# Patient Record
Sex: FEMALE | Race: WHITE | NOT HISPANIC OR LATINO
[De-identification: names, ages, dates, MRNs, and addresses within clinical notes are randomized per-mention and may not be internally consistent; named-entity substitution may affect disease eponyms.]

---

## 2017-01-03 ENCOUNTER — APPOINTMENT (OUTPATIENT)
Dept: SURGERY | Facility: CLINIC | Age: 52
End: 2017-01-03

## 2017-01-03 VITALS
BODY MASS INDEX: 21.26 KG/M2 | WEIGHT: 120 LBS | HEART RATE: 70 BPM | RESPIRATION RATE: 15 BRPM | DIASTOLIC BLOOD PRESSURE: 75 MMHG | SYSTOLIC BLOOD PRESSURE: 115 MMHG | HEIGHT: 63 IN

## 2017-02-01 ENCOUNTER — OUTPATIENT (OUTPATIENT)
Dept: OUTPATIENT SERVICES | Facility: HOSPITAL | Age: 52
LOS: 1 days | End: 2017-02-01
Payer: MEDICAID

## 2017-02-01 DIAGNOSIS — Z01.818 ENCOUNTER FOR OTHER PREPROCEDURAL EXAMINATION: ICD-10-CM

## 2017-02-01 DIAGNOSIS — K82.4 CHOLESTEROLOSIS OF GALLBLADDER: ICD-10-CM

## 2017-02-01 DIAGNOSIS — K80.50 CALCULUS OF BILE DUCT WITHOUT CHOLANGITIS OR CHOLECYSTITIS WITHOUT OBSTRUCTION: ICD-10-CM

## 2017-02-01 LAB
ALBUMIN SERPL ELPH-MCNC: 4.3 G/DL — SIGNIFICANT CHANGE UP (ref 3.3–5)
ALP SERPL-CCNC: 50 U/L — SIGNIFICANT CHANGE UP (ref 40–120)
ALT FLD-CCNC: 10 U/L — SIGNIFICANT CHANGE UP (ref 10–45)
AMYLASE P1 CFR SERPL: 54 U/L — SIGNIFICANT CHANGE UP (ref 25–125)
ANION GAP SERPL CALC-SCNC: 14 MMOL/L — SIGNIFICANT CHANGE UP (ref 5–17)
AST SERPL-CCNC: 12 U/L — SIGNIFICANT CHANGE UP (ref 10–40)
BILIRUB DIRECT SERPL-MCNC: 0.1 MG/DL — SIGNIFICANT CHANGE UP (ref 0–0.2)
BILIRUB INDIRECT FLD-MCNC: 0.4 MG/DL — SIGNIFICANT CHANGE UP (ref 0.2–1)
BILIRUB SERPL-MCNC: 0.5 MG/DL — SIGNIFICANT CHANGE UP (ref 0.2–1.2)
BUN SERPL-MCNC: 9 MG/DL — SIGNIFICANT CHANGE UP (ref 7–23)
CALCIUM SERPL-MCNC: 9.3 MG/DL — SIGNIFICANT CHANGE UP (ref 8.4–10.5)
CHLORIDE SERPL-SCNC: 101 MMOL/L — SIGNIFICANT CHANGE UP (ref 96–108)
CO2 SERPL-SCNC: 22 MMOL/L — SIGNIFICANT CHANGE UP (ref 22–31)
CREAT SERPL-MCNC: 0.78 MG/DL — SIGNIFICANT CHANGE UP (ref 0.5–1.3)
GLUCOSE SERPL-MCNC: 94 MG/DL — SIGNIFICANT CHANGE UP (ref 70–99)
HCT VFR BLD CALC: 36.4 % — SIGNIFICANT CHANGE UP (ref 34.5–45)
HGB BLD-MCNC: 12.2 G/DL — SIGNIFICANT CHANGE UP (ref 11.5–15.5)
MCHC RBC-ENTMCNC: 33.1 PG — SIGNIFICANT CHANGE UP (ref 27–34)
MCHC RBC-ENTMCNC: 33.5 GM/DL — SIGNIFICANT CHANGE UP (ref 32–36)
MCV RBC AUTO: 98.6 FL — SIGNIFICANT CHANGE UP (ref 80–100)
PLATELET # BLD AUTO: 202 K/UL — SIGNIFICANT CHANGE UP (ref 150–400)
POTASSIUM SERPL-MCNC: 3.7 MMOL/L — SIGNIFICANT CHANGE UP (ref 3.5–5.3)
POTASSIUM SERPL-SCNC: 3.7 MMOL/L — SIGNIFICANT CHANGE UP (ref 3.5–5.3)
PROT SERPL-MCNC: 7.3 G/DL — SIGNIFICANT CHANGE UP (ref 6–8.3)
RBC # BLD: 3.69 M/UL — LOW (ref 3.8–5.2)
RBC # FLD: 13.5 % — SIGNIFICANT CHANGE UP (ref 10.3–14.5)
SODIUM SERPL-SCNC: 137 MMOL/L — SIGNIFICANT CHANGE UP (ref 135–145)
WBC # BLD: 6.68 K/UL — SIGNIFICANT CHANGE UP (ref 3.8–10.5)
WBC # FLD AUTO: 6.68 K/UL — SIGNIFICANT CHANGE UP (ref 3.8–10.5)

## 2017-02-01 PROCEDURE — 80048 BASIC METABOLIC PNL TOTAL CA: CPT

## 2017-02-01 PROCEDURE — G0463: CPT

## 2017-02-01 PROCEDURE — 85027 COMPLETE CBC AUTOMATED: CPT

## 2017-02-01 PROCEDURE — 82150 ASSAY OF AMYLASE: CPT

## 2017-02-01 PROCEDURE — 36415 COLL VENOUS BLD VENIPUNCTURE: CPT

## 2017-02-01 PROCEDURE — 80076 HEPATIC FUNCTION PANEL: CPT

## 2017-02-08 ENCOUNTER — RESULT REVIEW (OUTPATIENT)
Age: 52
End: 2017-02-08

## 2017-02-09 ENCOUNTER — OUTPATIENT (OUTPATIENT)
Dept: OUTPATIENT SERVICES | Facility: HOSPITAL | Age: 52
LOS: 1 days | End: 2017-02-09
Payer: MEDICAID

## 2017-02-09 DIAGNOSIS — K80.50 CALCULUS OF BILE DUCT WITHOUT CHOLANGITIS OR CHOLECYSTITIS WITHOUT OBSTRUCTION: ICD-10-CM

## 2017-02-09 DIAGNOSIS — K82.4 CHOLESTEROLOSIS OF GALLBLADDER: ICD-10-CM

## 2017-02-09 PROCEDURE — 88304 TISSUE EXAM BY PATHOLOGIST: CPT | Mod: 26

## 2017-02-09 PROCEDURE — 88304 TISSUE EXAM BY PATHOLOGIST: CPT

## 2017-02-09 PROCEDURE — 47562 LAPAROSCOPIC CHOLECYSTECTOMY: CPT

## 2017-02-09 RX ORDER — OXYCODONE HYDROCHLORIDE 5 MG/1
1 TABLET ORAL
Qty: 20 | Refills: 0 | OUTPATIENT
Start: 2017-02-09

## 2017-02-09 NOTE — ASU DISCHARGE PLAN (ADULT/PEDIATRIC). - NOTIFY
Fever greater than 101/Bleeding that does not stop/Swelling that continues/Persistent Nausea and Vomiting

## 2017-02-09 NOTE — ASU DISCHARGE PLAN (ADULT/PEDIATRIC). - MEDICATION SUMMARY - MEDICATIONS TO TAKE
I will START or STAY ON the medications listed below when I get home from the hospital:    acetaminophen-oxyCODONE 325 mg-5 mg oral tablet  -- 1 tab(s) by mouth every 6 hours, As Needed -for moderate pain MDD:4 tabs  -- Caution federal law prohibits the transfer of this drug to any person other  than the person for whom it was prescribed.  May cause drowsiness.  Alcohol may intensify this effect.  Use care when operating dangerous machinery.  This prescription cannot be refilled.  This product contains acetaminophen.  Do not use  with any other product containing acetaminophen to prevent possible liver damage.  Using more of this medication than prescribed may cause serious breathing problems.    -- Indication: For Pain

## 2017-02-10 ENCOUNTER — TRANSCRIPTION ENCOUNTER (OUTPATIENT)
Age: 52
End: 2017-02-10

## 2017-02-13 ENCOUNTER — APPOINTMENT (OUTPATIENT)
Dept: ENDOCRINOLOGY | Facility: CLINIC | Age: 52
End: 2017-02-13

## 2017-02-13 VITALS
HEART RATE: 85 BPM | DIASTOLIC BLOOD PRESSURE: 70 MMHG | WEIGHT: 117 LBS | SYSTOLIC BLOOD PRESSURE: 108 MMHG | HEIGHT: 63 IN | OXYGEN SATURATION: 95 % | BODY MASS INDEX: 20.73 KG/M2

## 2017-02-13 LAB
ALBUMIN SERPL ELPH-MCNC: 4.1 G/DL
ALP BLD-CCNC: 49 U/L
ALT SERPL-CCNC: 17 U/L
ANION GAP SERPL CALC-SCNC: 16 MMOL/L
AST SERPL-CCNC: 11 U/L
BILIRUB SERPL-MCNC: 0.4 MG/DL
BUN SERPL-MCNC: 7 MG/DL
CALCIUM SERPL-MCNC: 9.2 MG/DL
CHLORIDE SERPL-SCNC: 101 MMOL/L
CO2 SERPL-SCNC: 23 MMOL/L
CREAT SERPL-MCNC: 0.6 MG/DL
GLUCOSE SERPL-MCNC: 100 MG/DL
POTASSIUM SERPL-SCNC: 4.6 MMOL/L
PROT SERPL-MCNC: 6.9 G/DL
SODIUM SERPL-SCNC: 140 MMOL/L
SURGICAL PATHOLOGY STUDY: SIGNIFICANT CHANGE UP

## 2017-02-17 ENCOUNTER — RESULT REVIEW (OUTPATIENT)
Age: 52
End: 2017-02-17

## 2017-02-17 LAB
T4 FREE SERPL-MCNC: 1.7 NG/DL
TSH SERPL-ACNC: 0.28 UIU/ML

## 2017-02-28 ENCOUNTER — APPOINTMENT (OUTPATIENT)
Dept: SURGERY | Facility: CLINIC | Age: 52
End: 2017-02-28

## 2017-02-28 VITALS
SYSTOLIC BLOOD PRESSURE: 116 MMHG | TEMPERATURE: 98.2 F | WEIGHT: 113 LBS | HEIGHT: 63 IN | HEART RATE: 81 BPM | OXYGEN SATURATION: 98 % | RESPIRATION RATE: 13 BRPM | BODY MASS INDEX: 20.02 KG/M2 | DIASTOLIC BLOOD PRESSURE: 74 MMHG

## 2017-06-16 ENCOUNTER — RX RENEWAL (OUTPATIENT)
Age: 52
End: 2017-06-16

## 2017-08-09 ENCOUNTER — APPOINTMENT (OUTPATIENT)
Dept: ENDOCRINOLOGY | Facility: CLINIC | Age: 52
End: 2017-08-09
Payer: MEDICAID

## 2017-08-09 VITALS
DIASTOLIC BLOOD PRESSURE: 80 MMHG | OXYGEN SATURATION: 98 % | HEIGHT: 63 IN | SYSTOLIC BLOOD PRESSURE: 120 MMHG | WEIGHT: 120 LBS | BODY MASS INDEX: 21.26 KG/M2 | HEART RATE: 79 BPM

## 2017-08-09 PROCEDURE — 99214 OFFICE O/P EST MOD 30 MIN: CPT

## 2017-08-09 RX ORDER — FEXOFENADINE HCL 60 MG
CAPSULE ORAL
Refills: 0 | Status: DISCONTINUED | COMMUNITY

## 2017-08-10 ENCOUNTER — RESULT REVIEW (OUTPATIENT)
Age: 52
End: 2017-08-10

## 2017-08-15 LAB
ALBUMIN SERPL ELPH-MCNC: 4.4 G/DL
ALP BLD-CCNC: 56 U/L
ALT SERPL-CCNC: 10 U/L
ANION GAP SERPL CALC-SCNC: 15 MMOL/L
AST SERPL-CCNC: 11 U/L
BILIRUB SERPL-MCNC: 0.6 MG/DL
BUN SERPL-MCNC: 10 MG/DL
CALCIUM SERPL-MCNC: 9.4 MG/DL
CHLORIDE SERPL-SCNC: 101 MMOL/L
CO2 SERPL-SCNC: 25 MMOL/L
CREAT SERPL-MCNC: 0.67 MG/DL
GLUCOSE SERPL-MCNC: 105 MG/DL
POTASSIUM SERPL-SCNC: 4.1 MMOL/L
PROT SERPL-MCNC: 7.5 G/DL
SODIUM SERPL-SCNC: 141 MMOL/L
T4 FREE SERPL-MCNC: 1.4 NG/DL
TSH RECEPTOR AB: <0.5 IU/L
TSH SERPL-ACNC: 0.99 UIU/ML

## 2017-10-25 ENCOUNTER — APPOINTMENT (OUTPATIENT)
Dept: ENDOCRINOLOGY | Facility: CLINIC | Age: 52
End: 2017-10-25
Payer: MEDICAID

## 2017-10-25 VITALS
DIASTOLIC BLOOD PRESSURE: 80 MMHG | HEART RATE: 71 BPM | WEIGHT: 122 LBS | BODY MASS INDEX: 21.62 KG/M2 | OXYGEN SATURATION: 97 % | HEIGHT: 63 IN | SYSTOLIC BLOOD PRESSURE: 110 MMHG

## 2017-10-25 PROCEDURE — 99214 OFFICE O/P EST MOD 30 MIN: CPT

## 2017-10-27 LAB
ALBUMIN SERPL ELPH-MCNC: 4.7 G/DL
ALP BLD-CCNC: 54 U/L
ALT SERPL-CCNC: 14 U/L
ANION GAP SERPL CALC-SCNC: 17 MMOL/L
AST SERPL-CCNC: 16 U/L
BASOPHILS # BLD AUTO: 0.02 K/UL
BASOPHILS NFR BLD AUTO: 0.4 %
BILIRUB SERPL-MCNC: 0.9 MG/DL
BUN SERPL-MCNC: 10 MG/DL
CALCIUM SERPL-MCNC: 9.5 MG/DL
CHLORIDE SERPL-SCNC: 103 MMOL/L
CO2 SERPL-SCNC: 23 MMOL/L
CREAT SERPL-MCNC: 0.75 MG/DL
EOSINOPHIL # BLD AUTO: 0.18 K/UL
EOSINOPHIL NFR BLD AUTO: 3.7 %
GLUCOSE SERPL-MCNC: 88 MG/DL
HCT VFR BLD CALC: 37.8 %
HGB BLD-MCNC: 12.7 G/DL
IMM GRANULOCYTES NFR BLD AUTO: 0 %
LYMPHOCYTES # BLD AUTO: 2.33 K/UL
LYMPHOCYTES NFR BLD AUTO: 47.6 %
MAN DIFF?: NORMAL
MCHC RBC-ENTMCNC: 33.6 GM/DL
MCHC RBC-ENTMCNC: 33.8 PG
MCV RBC AUTO: 100.5 FL
MONOCYTES # BLD AUTO: 0.47 K/UL
MONOCYTES NFR BLD AUTO: 9.6 %
NEUTROPHILS # BLD AUTO: 1.9 K/UL
NEUTROPHILS NFR BLD AUTO: 38.7 %
PLATELET # BLD AUTO: 191 K/UL
POTASSIUM SERPL-SCNC: 4.2 MMOL/L
PROT SERPL-MCNC: 7.8 G/DL
RBC # BLD: 3.76 M/UL
RBC # FLD: 13 %
SODIUM SERPL-SCNC: 143 MMOL/L
T4 FREE SERPL-MCNC: 1.6 NG/DL
TSH RECEPTOR AB: 0.55 IU/L
TSH SERPL-ACNC: 0.54 UIU/ML
WBC # FLD AUTO: 4.9 K/UL

## 2018-04-30 ENCOUNTER — APPOINTMENT (OUTPATIENT)
Dept: ENDOCRINOLOGY | Facility: CLINIC | Age: 53
End: 2018-04-30

## 2018-05-08 ENCOUNTER — APPOINTMENT (OUTPATIENT)
Dept: ENDOCRINOLOGY | Facility: CLINIC | Age: 53
End: 2018-05-08
Payer: MEDICAID

## 2018-05-08 ENCOUNTER — LABORATORY RESULT (OUTPATIENT)
Age: 53
End: 2018-05-08

## 2018-05-08 VITALS
BODY MASS INDEX: 21.26 KG/M2 | HEIGHT: 63 IN | RESPIRATION RATE: 16 BRPM | DIASTOLIC BLOOD PRESSURE: 68 MMHG | OXYGEN SATURATION: 99 % | SYSTOLIC BLOOD PRESSURE: 100 MMHG | HEART RATE: 77 BPM | WEIGHT: 120 LBS

## 2018-05-08 PROCEDURE — 99214 OFFICE O/P EST MOD 30 MIN: CPT

## 2018-05-14 LAB
T4 FREE SERPL-MCNC: 1.5 NG/DL
TSH SERPL-ACNC: 0.76 UIU/ML

## 2018-11-12 ENCOUNTER — APPOINTMENT (OUTPATIENT)
Dept: ENDOCRINOLOGY | Facility: CLINIC | Age: 53
End: 2018-11-12
Payer: MEDICAID

## 2018-11-12 VITALS
HEART RATE: 85 BPM | DIASTOLIC BLOOD PRESSURE: 70 MMHG | BODY MASS INDEX: 22.14 KG/M2 | WEIGHT: 125 LBS | OXYGEN SATURATION: 99 % | SYSTOLIC BLOOD PRESSURE: 108 MMHG

## 2018-11-12 PROCEDURE — 99214 OFFICE O/P EST MOD 30 MIN: CPT

## 2018-11-16 LAB
25(OH)D3 SERPL-MCNC: 16.9 NG/ML
ALBUMIN SERPL ELPH-MCNC: 4.5 G/DL
ALP BLD-CCNC: 59 U/L
ALT SERPL-CCNC: 16 U/L
ANION GAP SERPL CALC-SCNC: 17 MMOL/L
AST SERPL-CCNC: 20 U/L
BASOPHILS # BLD AUTO: 0.02 K/UL
BASOPHILS NFR BLD AUTO: 0.4 %
BILIRUB SERPL-MCNC: 0.3 MG/DL
BUN SERPL-MCNC: 11 MG/DL
CALCIUM SERPL-MCNC: 9.5 MG/DL
CHLORIDE SERPL-SCNC: 102 MMOL/L
CHOLEST SERPL-MCNC: 234 MG/DL
CHOLEST/HDLC SERPL: 2.7 RATIO
CO2 SERPL-SCNC: 21 MMOL/L
CREAT SERPL-MCNC: 0.51 MG/DL
EOSINOPHIL # BLD AUTO: 0.24 K/UL
EOSINOPHIL NFR BLD AUTO: 5.2 %
GLUCOSE SERPL-MCNC: 102 MG/DL
HBA1C MFR BLD HPLC: 5.7 %
HCT VFR BLD CALC: 39.2 %
HDLC SERPL-MCNC: 87 MG/DL
HGB BLD-MCNC: 12.5 G/DL
IMM GRANULOCYTES NFR BLD AUTO: 0.4 %
LDLC SERPL CALC-MCNC: 124 MG/DL
LYMPHOCYTES # BLD AUTO: 1.98 K/UL
LYMPHOCYTES NFR BLD AUTO: 42.8 %
MAN DIFF?: NORMAL
MCHC RBC-ENTMCNC: 31.9 GM/DL
MCHC RBC-ENTMCNC: 32.7 PG
MCV RBC AUTO: 102.6 FL
MONOCYTES # BLD AUTO: 0.26 K/UL
MONOCYTES NFR BLD AUTO: 5.6 %
NEUTROPHILS # BLD AUTO: 2.11 K/UL
NEUTROPHILS NFR BLD AUTO: 45.6 %
PLATELET # BLD AUTO: 203 K/UL
POTASSIUM SERPL-SCNC: 3.8 MMOL/L
PROT SERPL-MCNC: 7.4 G/DL
RBC # BLD: 3.82 M/UL
RBC # FLD: 13.7 %
SODIUM SERPL-SCNC: 140 MMOL/L
T4 FREE SERPL-MCNC: 1.5 NG/DL
TRIGL SERPL-MCNC: 117 MG/DL
TSH SERPL-ACNC: 0.51 UIU/ML
WBC # FLD AUTO: 4.63 K/UL

## 2019-05-13 ENCOUNTER — APPOINTMENT (OUTPATIENT)
Dept: ENDOCRINOLOGY | Facility: CLINIC | Age: 54
End: 2019-05-13
Payer: MEDICAID

## 2019-05-13 VITALS
HEIGHT: 63 IN | DIASTOLIC BLOOD PRESSURE: 72 MMHG | OXYGEN SATURATION: 99 % | HEART RATE: 98 BPM | WEIGHT: 125 LBS | BODY MASS INDEX: 22.15 KG/M2 | SYSTOLIC BLOOD PRESSURE: 114 MMHG

## 2019-05-13 PROCEDURE — 99214 OFFICE O/P EST MOD 30 MIN: CPT

## 2019-05-13 NOTE — HISTORY OF PRESENT ILLNESS
[FreeTextEntry1] : 52 y/o F diagnosed with hyperthyroidism 2/2015 with tachycardia, tremors, heat intolerance. Started initially on PTU, changed to methimazole 20 mg BID decreased to 10 mg daily. Seen initially by me 9/2015 with negative TSH receptor Ab TSH 0.01 and Free T4 1.9. Recommended increasing dose to 20 mg daily, but patient was still only taking 10 mg daily. Methimazole decreased to 7.5 mg 12/2015 based on TSH of 0.48 and Free T4 1.0. Dose lowered to 5 mg daily 8/2016 TSH 1.4 T4 7.9. Then 2.5 mg daily for TSH 0.28 Free T4 1.7 (2/2017). Seen 8/2017 with TSH of 0.99 Free T4 1.4 and negative TSH receptor Ab. Recommended discontinue methimazole and repeat TFTs. Presented 5/2018 for follow-up chemically and clinically euthyroid.  Returned 11/2018 and remained chemically euthyroid. Returns today for follow-up.+Fatigue occasionally. Now with improved palpitations, tremors. Now with some occasional heat and cold intolerance she attributes to menopause. +weight stable. Denies vomiting, abdominal pain, dizziness. +improved pruritus. No changes to vision, but claims she feels like "something is in her eyes". Denies lacrimation, or dryness. No family hx of thyroid conditions. No dysphagia, dysphonia. Never on lithium or amiodarone. No IV contrast. No recent upper respiratory infection. Was recommended for thyroid uptake and scan but did not go. \par \par Hx of prediabetes with last A1c of 5.7% (11/2018). Eats rice and noodles. Trying to monitor more closely. \par \par Hx of Vit D with last Vit D 16 (11/2018).

## 2019-05-13 NOTE — ASSESSMENT
[Methimazole Therapy] : Risks and benefits of methimazole therapy were discussed with the patient,  including rash, liver dysfunction, and agranulocytosis.  Patient was instructed to call the office for flu-like symptoms eg fever and sore-throat [FreeTextEntry1] : 54 y/o F w/ \par \par 1. Hyperthyroidism likely Graves' with heterogenous goiter on thyroid U/S 9/2015 (see results section for full description). Will repeat thyroid function tests now off methimazole. If recurrence of hyperthyroidism, discussed treatment with FARFAN vs. restarting methimazole vs. thyroidectomy. Discussed side effects such as cholestasis and agranulocytosis with methimazole. If FARFAN treatment preferred will send for uptake and scan. \par \par 2. Prediabetes- check A1c. Dietary and lifestyle modifications discussed.\par \par 3. Vit D Deficiency- current on vitamin D supplement with zinc and magnesium. Recommend additional Vit D supplement 2,000 IU daily.

## 2019-05-13 NOTE — PHYSICAL EXAM
[Alert] : alert [No Acute Distress] : no acute distress [Well Nourished] : well nourished [Well Developed] : well developed [Normal Sclera/Conjunctiva] : normal sclera/conjunctiva [EOMI] : extra ocular movement intact [No Lid Lag] : no lid lag [No Proptosis] : no proptosis [Normal Oropharynx] : the oropharynx was normal [Supple] : the neck was supple [No Respiratory Distress] : no respiratory distress [No Accessory Muscle Use] : no accessory muscle use [Clear to Auscultation] : lungs were clear to auscultation bilaterally [Normal Rate and Effort] : normal respiratory rhythm and effort [Normal S1, S2] : normal S1 and S2 [Normal Rate] : heart rate was normal  [Regular Rhythm] : with a regular rhythm [Normal Bowel Sounds] : normal bowel sounds [Not Tender] : non-tender [Soft] : abdomen soft [Not Distended] : not distended [No Spinal Tenderness] : no spinal tenderness [No Clubbing, Cyanosis] : no clubbing  or cyanosis of the fingernails [No Stigmata of Cushings Syndrome] : no stigmata of cushings syndrome [Normal Strength/Tone] : muscle strength and tone were normal [No Motor Deficits] : the motor exam was normal [No Tremors] : no tremors [Oriented x3] : oriented to person, place, and time [Normal Mood] : the mood was normal [Normal Affect] : the affect was normal [Kyphosis] : no kyphosis present [Acanthosis Nigricans] : no acanthosis nigricans [de-identified] : +thyroid enlarged, firm. No bruit

## 2019-05-13 NOTE — PROCEDURE
[[employer] e 2008 model, 10-12 MHz frequencies] : multiple real time longitudinal and transverse images were obtained using a high resolution ultrasound with a linear transducer, Smart [employer] e 2008 model, 10-12 MHz frequencies. All measurements will be reported as longitudinal x jenelle-posterior x transverse. [None] : None [Hyperthyroidism] : hyperthyroidism [Goiter] : goiter [] : multiple ill defined areas of hypoechogenicity [There are no distinct nodules visualized.] : There are no distinct nodules visualized. [FreeTextEntry1] : 4.48 x 1.51 x 2.94 [FreeTextEntry5] : 4.81 x 1.22 x 2.72 [FreeTextEntry2] : 0.41 [FreeTextEntry6] : minimal bilateral vascularity

## 2019-05-13 NOTE — DATA REVIEWED
[FreeTextEntry1] : Labs 11/2018:\par A1c 5.7%\par TSH 0.51\par Free T4 1.5\par \par HDL 87\par Chol 234\par Trig 117\par CMP normal except glucose of 102\par Vit D 16\par \par Labs 5/2018:\par TSH 0.76\par Free T4 1.5\par \par Labs 2/2017:\par TSH 0.28\par Free T4 1.7\par CMP normal except glucose of 100\par \par Labs 11/2016:\par CMP normal\par Free T4 1.4\par TSH 1.56\par TSH Receptor Ab <1.00\par \par Labs 8/2016:\par TSH 1.32\par T4 7.9\par Free T4 1.38\par CMP normal\par \par Labs 3/2016:\par CMP normal except glucose of 101\par TSH 0.56\par Free T4 1.3\par \par Labs 12/2015:\par TSH 0.48\par Free T4 1.0\par \par Labs 9/2015:\par TSH 0.01\par Free T4 1.9\par TSH receptor Ab neg. \par \par Thyroid Ultrasound Report 9/1/15:\par \par Technique: multiple real time longitudinal and transverse images were obtained using a high resolution ultrasound with a linear transducer, [employer] e 2008 model, 10-12 MHz frequencies. All measurements will be reported as longitudinal x jenelle-posterior x transverse. \par Comparison: None. \par \par Indication: goiter and hyperthyroidism. \par \par Findings: \par The right thyroid lobe measures 4.48 x 1.51 x 2.94 cm. The left thyroid lobe measures 4.81 x 1.22 x 2.72 cm. The isthmus measures 0.41 cm. \par The right thyroid lobe has a heterogeneous parenchyma. It has multiple ill defined areas of hypoechogenicity. \par The left thyroid lobe has a heterogeneous parenchyma . It has multiple ill defined areas of hypoechogenicity. \par minimal bilateral vascularity. \par There are no distinct nodules visualized. \par \par Labs 8/2015:\par TSH 0.007\par CBC normal\par CMP normal except AlkP of 129\par \par Labs 4/2015:\par CBC normal except platelets of 147\par CMP normal except AlkP of 126\par TSH 0.013\par Free T4 2.9\par T3 203

## 2019-05-13 NOTE — REVIEW OF SYSTEMS
[Fatigue] : fatigue [Cold Intolerance] : cold intolerant [Heat Intolerance] : heat intolerant [All other systems negative] : All other systems negative [Recent Weight Gain (___ Lbs)] : no recent weight gain [Recent Weight Loss (___ Lbs)] : no recent weight loss [Blurry Vision] : no blurred vision [Dysphagia] : no dysphagia [Dysphonia] : no dysphonia [Chest Pain] : no chest pain [Palpitations] : no palpitations [Shortness Of Breath] : no shortness of breath [Nausea] : no nausea [Vomiting] : no vomiting was observed [Constipation] : no constipation [Diarrhea] : no diarrhea [Muscle Weakness] : no muscle weakness [Headache] : no headaches [Tremors] : no tremors [FreeTextEntry2] : occasional fatigue [de-identified] : occasional cold and heat intolerance with diaphoresis

## 2019-05-23 ENCOUNTER — RESULT REVIEW (OUTPATIENT)
Age: 54
End: 2019-05-23

## 2019-05-23 LAB
ESTIMATED AVERAGE GLUCOSE: 111 MG/DL
HBA1C MFR BLD HPLC: 5.5 %
T4 FREE SERPL-MCNC: 1.7 NG/DL
TSH SERPL-ACNC: 0.11 UIU/ML

## 2019-08-01 ENCOUNTER — RX RENEWAL (OUTPATIENT)
Age: 54
End: 2019-08-01

## 2019-08-01 LAB
T4 FREE SERPL-MCNC: 3.1 NG/DL
TSH SERPL-ACNC: 0.01 UIU/ML

## 2019-09-09 ENCOUNTER — APPOINTMENT (OUTPATIENT)
Dept: ENDOCRINOLOGY | Facility: CLINIC | Age: 54
End: 2019-09-09
Payer: MEDICAID

## 2019-09-09 VITALS
WEIGHT: 120 LBS | BODY MASS INDEX: 21.26 KG/M2 | SYSTOLIC BLOOD PRESSURE: 116 MMHG | OXYGEN SATURATION: 98 % | HEART RATE: 90 BPM | DIASTOLIC BLOOD PRESSURE: 74 MMHG | HEIGHT: 63 IN

## 2019-09-09 PROCEDURE — 99214 OFFICE O/P EST MOD 30 MIN: CPT

## 2019-09-09 NOTE — DATA REVIEWED
[FreeTextEntry1] : Labs 7/2019:\par TSH 0.01\par Free T4 3.1\par \par lABS 5/2019:\par TSH 0.11\par Free T4 1.7\par \par Labs 11/2018:\par A1c 5.7%\par TSH 0.51\par Free T4 1.5\par \par HDL 87\par Chol 234\par Trig 117\par CMP normal except glucose of 102\par Vit D 16\par \par Labs 5/2018:\par TSH 0.76\par Free T4 1.5\par \par Labs 2/2017:\par TSH 0.28\par Free T4 1.7\par CMP normal except glucose of 100\par \par Labs 11/2016:\par CMP normal\par Free T4 1.4\par TSH 1.56\par TSH Receptor Ab <1.00\par \par Labs 8/2016:\par TSH 1.32\par T4 7.9\par Free T4 1.38\par CMP normal\par \par Labs 3/2016:\par CMP normal except glucose of 101\par TSH 0.56\par Free T4 1.3\par \par Labs 12/2015:\par TSH 0.48\par Free T4 1.0\par \par Labs 9/2015:\par TSH 0.01\par Free T4 1.9\par TSH receptor Ab neg. \par \par Thyroid Ultrasound Report 9/1/15:\par \par Technique: multiple real time longitudinal and transverse images were obtained using a high resolution ultrasound with a linear transducer, "[employer] e 2008 model, 10-12 MHz frequencies. All measurements will be reported as longitudinal x jenelle-posterior x transverse. \par Comparison: None. \par \par Indication: goiter and hyperthyroidism. \par \par Findings: \par The right thyroid lobe measures 4.48 x 1.51 x 2.94 cm. The left thyroid lobe measures 4.81 x 1.22 x 2.72 cm. The isthmus measures 0.41 cm. \par The right thyroid lobe has a heterogeneous parenchyma. It has multiple ill defined areas of hypoechogenicity. \par The left thyroid lobe has a heterogeneous parenchyma . It has multiple ill defined areas of hypoechogenicity. \par minimal bilateral vascularity. \par There are no distinct nodules visualized. \par \par Labs 8/2015:\par TSH 0.007\par CBC normal\par CMP normal except AlkP of 129\par \par Labs 4/2015:\par CBC normal except platelets of 147\par CMP normal except AlkP of 126\par TSH 0.013\par Free T4 2.9\par T3 203

## 2019-09-09 NOTE — ASSESSMENT
[Methimazole Therapy] : Risks and benefits of methimazole therapy were discussed with the patient,  including rash, liver dysfunction, and agranulocytosis.  Patient was instructed to call the office for flu-like symptoms eg fever and sore-throat [FreeTextEntry1] : 52 y/o F w/ \par \par 1. Hyperthyroidism likely Graves' with heterogenous goiter on thyroid U/S 9/2015 (see results section for full description). Will repeat thyroid function tests now back on methimazole. Discussed treatment with FARFAN vs. restarting methimazole vs. thyroidectomy. Discussed side effects such as cholestasis and agranulocytosis with methimazole. Will send for uptake and scan. \par \par 2. Prediabetes- check A1c. Dietary and lifestyle modifications discussed.\par \par 3. Vit D Deficiency- current on vitamin D supplement with zinc and magnesium. Recommend additional Vit D supplement 2,000 IU daily.

## 2019-09-09 NOTE — PROCEDURE
[None] : None [[employer] e 2008 model, 10-12 MHz frequencies] : multiple real time longitudinal and transverse images were obtained using a high resolution ultrasound with a linear transducer, [employer] e 2008 model, 10-12 MHz frequencies. All measurements will be reported as longitudinal x jenelle-posterior x transverse. [Hyperthyroidism] : hyperthyroidism [Goiter] : goiter [There are no distinct nodules visualized.] : There are no distinct nodules visualized. [] : multiple ill defined areas of hypoechogenicity [FreeTextEntry1] : 4.48 x 1.51 x 2.94 [FreeTextEntry5] : 4.81 x 1.22 x 2.72 [FreeTextEntry2] : 0.41 [FreeTextEntry6] : minimal bilateral vascularity

## 2019-09-09 NOTE — REASON FOR VISIT
[Family Member] : family member [Follow-Up: _____] : a [unfilled] follow-up visit [FreeTextEntry1] : Hyperthyroidism

## 2019-09-09 NOTE — PHYSICAL EXAM
[No Acute Distress] : no acute distress [Alert] : alert [Well Nourished] : well nourished [Well Developed] : well developed [Normal Sclera/Conjunctiva] : normal sclera/conjunctiva [EOMI] : extra ocular movement intact [No Proptosis] : no proptosis [No Lid Lag] : no lid lag [Normal Oropharynx] : the oropharynx was normal [Supple] : the neck was supple [No Respiratory Distress] : no respiratory distress [Normal Rate and Effort] : normal respiratory rhythm and effort [Clear to Auscultation] : lungs were clear to auscultation bilaterally [No Accessory Muscle Use] : no accessory muscle use [Normal S1, S2] : normal S1 and S2 [Normal Rate] : heart rate was normal  [Normal Bowel Sounds] : normal bowel sounds [Regular Rhythm] : with a regular rhythm [Soft] : abdomen soft [Not Tender] : non-tender [No Spinal Tenderness] : no spinal tenderness [Not Distended] : not distended [No Clubbing, Cyanosis] : no clubbing  or cyanosis of the fingernails [No Stigmata of Cushings Syndrome] : no stigmata of cushings syndrome [Normal Strength/Tone] : muscle strength and tone were normal [No Motor Deficits] : the motor exam was normal [No Tremors] : no tremors [Oriented x3] : oriented to person, place, and time [Normal Mood] : the mood was normal [Normal Affect] : the affect was normal [Kyphosis] : no kyphosis present [Acanthosis Nigricans] : no acanthosis nigricans [de-identified] : +thyroid enlarged, firm. No bruit

## 2019-09-09 NOTE — REVIEW OF SYSTEMS
[Fatigue] : fatigue [Cold Intolerance] : cold intolerant [Heat Intolerance] : heat intolerant [All other systems negative] : All other systems negative [Recent Weight Gain (___ Lbs)] : no recent weight gain [Recent Weight Loss (___ Lbs)] : no recent weight loss [Blurry Vision] : no blurred vision [Dysphagia] : no dysphagia [Dysphonia] : no dysphonia [Chest Pain] : no chest pain [Palpitations] : no palpitations [Shortness Of Breath] : no shortness of breath [Nausea] : no nausea [Vomiting] : no vomiting was observed [Diarrhea] : no diarrhea [Constipation] : no constipation [Muscle Weakness] : no muscle weakness [Headache] : no headaches [Tremors] : no tremors [FreeTextEntry2] : occasional fatigue [de-identified] : occasional cold and heat intolerance with diaphoresis

## 2019-09-09 NOTE — HISTORY OF PRESENT ILLNESS
[FreeTextEntry1] : 52 y/o F diagnosed with hyperthyroidism 2/2015 with tachycardia, tremors, heat intolerance. Started initially on PTU, changed to methimazole 20 mg BID decreased to 10 mg daily. Seen initially by me 9/2015 with negative TSH receptor Ab TSH 0.01 and Free T4 1.9. Recommended increasing dose to 20 mg daily, but patient was still only taking 10 mg daily. Methimazole decreased to 7.5 mg 12/2015 based on TSH of 0.48 and Free T4 1.0. Dose lowered to 5 mg daily 8/2016 TSH 1.4 T4 7.9. Then 2.5 mg daily for TSH 0.28 Free T4 1.7 (2/2017). Seen 8/2017 with TSH of 0.99 Free T4 1.4 and negative TSH receptor Ab. Recommended discontinue methimazole and repeat TFTs. Presented 5/2018 for follow-up chemically and clinically euthyroid.  Returned 11/2018 and remained chemically euthyroid. Returned for follow-up 5/2019 with labs evidence of possible trending towards recurrence of hyperthyroidism with TSH of 0.11. Repeat labs 7/2019 confirmed overt hyperthyroidism with TSH of 0.01 and Free T4 of 3.1. Recommended restart methimazole at 10 mg daily which she has been adherent to. Presents today for follow-up.+Fatigue occasionally. Now with improved palpitations, tremors. Now with some occasional heat and cold intolerance she attributes to menopause. +weight stable. Denies vomiting, abdominal pain, dizziness. +improved pruritus. No changes to vision, but claims she feels like "something is in her eyes". Denies lacrimation, or dryness. No family hx of thyroid conditions. No dysphagia, dysphonia. Never on lithium or amiodarone. No IV contrast. No recent upper respiratory infection. Was recommended for thyroid uptake and scan but did not go. \par \par Hx of prediabetes with last A1c of 5.7% (11/2018). Eats rice and noodles. Trying to monitor more closely. \par \par Hx of Vit D with last Vit D 16 (11/2018).

## 2019-09-10 LAB
ESTIMATED AVERAGE GLUCOSE: 114 MG/DL
HBA1C MFR BLD HPLC: 5.6 %

## 2019-09-16 ENCOUNTER — RESULT REVIEW (OUTPATIENT)
Age: 54
End: 2019-09-16

## 2019-09-16 LAB
T4 FREE SERPL-MCNC: 2.1 NG/DL
TSH RECEPTOR AB: <1.1 IU/L
TSH SERPL-ACNC: <0.01 UIU/ML

## 2019-12-16 ENCOUNTER — APPOINTMENT (OUTPATIENT)
Dept: ENDOCRINOLOGY | Facility: CLINIC | Age: 54
End: 2019-12-16

## 2020-03-12 ENCOUNTER — RX RENEWAL (OUTPATIENT)
Age: 55
End: 2020-03-12

## 2020-05-18 ENCOUNTER — APPOINTMENT (OUTPATIENT)
Dept: ENDOCRINOLOGY | Facility: CLINIC | Age: 55
End: 2020-05-18
Payer: MEDICAID

## 2020-05-18 PROCEDURE — 99214 OFFICE O/P EST MOD 30 MIN: CPT

## 2020-05-18 NOTE — ASSESSMENT
[Methimazole Therapy] : Risks and benefits of methimazole therapy were discussed with the patient,  including rash, liver dysfunction, and agranulocytosis.  Patient was instructed to call the office for flu-like symptoms eg fever and sore-throat [Methimazole Therapy/PTU Therapy] : Risks and benefits of methimazole therapy/PTU therapy were discussed with the patient, including rash, liver dysfunction, and agranulocytosis. Patient was instructed to call the office for flu-like symptoms (e.g. fever and sore-throat) [FreeTextEntry1] : 53 y/o F w/ \par \par 1. Hyperthyroidism likely Graves' with heterogenous goiter on thyroid U/S 9/2015 (see results section for full description). Will repeat thyroid function tests now back on methimazole. Discussed treatment with FARFAN vs. restarting methimazole vs. thyroidectomy. Discussed side effects such as cholestasis and agranulocytosis with methimazole. Prefer to avoid long term treatment with methimazole but patient defers FARFAN or thyroidectomy at this time.\par \par 2. Prediabetes- check A1c. Dietary and lifestyle modifications discussed.\par \par 3. Vit D Deficiency- current on vitamin D supplement with zinc and magnesium. Recommend additional Vit D supplement 2,000 IU daily.

## 2020-05-18 NOTE — PHYSICAL EXAM
[Alert] : alert [Well Nourished] : well nourished [Healthy Appearance] : healthy appearance [Normal Voice/Communication] : normal voice communication [No Respiratory Distress] : no respiratory distress [No Accessory Muscle Use] : no accessory muscle use [Normal Rate and Effort] : normal respiratory rate and effort [No Stigmata of Cushings Syndrome] : no stigmata of Cushings Syndrome [Oriented x3] : oriented to person, place, and time [Normal Affect] : the affect was normal [EOMI] : extra ocular movement intact [de-identified] : visibly enlarged gland [No Proptosis] : no proptosis

## 2020-05-18 NOTE — REVIEW OF SYSTEMS
[All other systems negative] : All other systems negative [Fatigue] : fatigue [Recent Weight Loss (___ Lbs)] : no recent weight loss [Recent Weight Gain (___ Lbs)] : no recent weight gain [Visual Field Defect] : no visual field defect [Dysphagia] : no dysphagia [Neck Pain] : no neck pain [Dysphonia] : no dysphonia [Chest Pain] : no chest pain [Nausea] : no nausea [Shortness Of Breath] : no shortness of breath [Vomiting] : no vomiting [Polyuria] : no polyuria [Headaches] : no headaches [Joint Pain] : no joint pain [Polydipsia] : no polydipsia [Tremors] : no tremors [FreeTextEntry2] : occasional fatigue [Heat Intolerance] : no heat intolerance [Cold Intolerance] : no cold intolerance

## 2020-05-18 NOTE — PROCEDURE
[[employer] e 2008 model, 10-12 MHz frequencies] : multiple real time longitudinal and transverse images were obtained using a high resolution ultrasound with a linear transducer, [employer] e 2008 model, 10-12 MHz frequencies. All measurements will be reported as longitudinal x jenelle-posterior x transverse. [None] : None [Goiter] : goiter [Hyperthyroidism] : hyperthyroidism [] : multiple ill defined areas of hypoechogenicity [There are no distinct nodules visualized.] : There are no distinct nodules visualized. [FreeTextEntry5] : 4.81 x 1.22 x 2.72 [FreeTextEntry1] : 4.48 x 1.51 x 2.94 [FreeTextEntry2] : 0.41 [FreeTextEntry6] : minimal bilateral vascularity

## 2020-05-18 NOTE — DATA REVIEWED
[FreeTextEntry1] : Labs 9/2019:\par TSH 0.01\par Free T4 2.1\par TSH Receptor Ab neg.\par A1c 5.6%\par \par Labs 7/2019:\par TSH 0.01\par Free T4 3.1\par \par lABS 5/2019:\par TSH 0.11\par Free T4 1.7\par \par Labs 11/2018:\par A1c 5.7%\par TSH 0.51\par Free T4 1.5\par \par HDL 87\par Chol 234\par Trig 117\par CMP normal except glucose of 102\par Vit D 16\par \par Labs 5/2018:\par TSH 0.76\par Free T4 1.5\par \par Labs 2/2017:\par TSH 0.28\par Free T4 1.7\par CMP normal except glucose of 100\par \par Labs 11/2016:\par CMP normal\par Free T4 1.4\par TSH 1.56\par TSH Receptor Ab <1.00\par \par Labs 8/2016:\par TSH 1.32\par T4 7.9\par Free T4 1.38\par CMP normal\par \par Labs 3/2016:\par CMP normal except glucose of 101\par TSH 0.56\par Free T4 1.3\par \par Labs 12/2015:\par TSH 0.48\par Free T4 1.0\par \par Labs 9/2015:\par TSH 0.01\par Free T4 1.9\par TSH receptor Ab neg. \par \par Thyroid Ultrasound Report 9/1/15:\par \par Technique: multiple real time longitudinal and transverse images were obtained using a high resolution ultrasound with a linear transducer, [employer] e 2008 model, 10-12 MHz frequencies. All measurements will be reported as longitudinal x jenelle-posterior x transverse. \par Comparison: None. \par \par Indication: goiter and hyperthyroidism. \par \par Findings: \par The right thyroid lobe measures 4.48 x 1.51 x 2.94 cm. The left thyroid lobe measures 4.81 x 1.22 x 2.72 cm. The isthmus measures 0.41 cm. \par The right thyroid lobe has a heterogeneous parenchyma. It has multiple ill defined areas of hypoechogenicity. \par The left thyroid lobe has a heterogeneous parenchyma . It has multiple ill defined areas of hypoechogenicity. \par minimal bilateral vascularity. \par There are no distinct nodules visualized. \par \par Labs 8/2015:\par TSH 0.007\par CBC normal\par CMP normal except AlkP of 129\par \par Labs 4/2015:\par CBC normal except platelets of 147\par CMP normal except AlkP of 126\par TSH 0.013\par Free T4 2.9\par T3 203

## 2020-05-18 NOTE — HISTORY OF PRESENT ILLNESS
[Home] : at home, [unfilled] , at the time of the visit. [Other Location: e.g. Home (Enter Location, City,State)___] : at [unfilled] [Family Member] : family member [Patient] : the patient [Self] : self [FreeTextEntry2] : Yuan Mauro [FreeTextEntry1] : 55 y/o F diagnosed with hyperthyroidism 2/2015 with tachycardia, tremors, heat intolerance. Started initially on PTU, changed to methimazole 20 mg BID decreased to 10 mg daily. Seen initially by me 9/2015 with negative TSH receptor Ab TSH 0.01 and Free T4 1.9. Recommended increasing dose to 20 mg daily, but patient was still only taking 10 mg daily. Methimazole decreased to 7.5 mg 12/2015 based on TSH of 0.48 and Free T4 1.0. Dose lowered to 5 mg daily 8/2016 TSH 1.4 T4 7.9. Then 2.5 mg daily for TSH 0.28 Free T4 1.7 (2/2017). Seen 8/2017 with TSH of 0.99 Free T4 1.4 and negative TSH receptor Ab. Recommended discontinue methimazole and repeat TFTs. Presented 5/2018 for follow-up chemically and clinically euthyroid.  Returned 11/2018 and remained chemically euthyroid. Returned for follow-up 5/2019 with labs evidence of possible trending towards recurrence of hyperthyroidism with TSH of 0.11. Repeat labs 7/2019 confirmed overt hyperthyroidism with TSH of 0.01 and Free T4 of 3.1. Recommended restart methimazole at 10 mg daily which she has been adherent to. Repeat Labs 9/2029 with continued suppressed TSH but improved Free T4 to 2.1. Recommended uptake and scan and possible FARFAN tx, but patient defers FARFAN tx. Presents today for follow-up.+Fatigue occasionally. Now with improved palpitations, tremors. Now with some occasional heat and cold intolerance she attributes to menopause. +weight stable. Denies vomiting, abdominal pain, dizziness. +improved pruritus. No changes to vision, but claims she feels like "something is in her eyes". Denies lacrimation, or dryness. No family hx of thyroid conditions. No dysphagia, dysphonia. Never on lithium or amiodarone. No IV contrast. No recent upper respiratory infection. Was recommended for thyroid uptake and scan but did not go because she does not want to be treated with FARFAN. \par \par Hx of prediabetes with improvement on last A1c of 5.6%. Eats rice and noodles. Trying to monitor more closely. \par \par Hx of Vit D with last Vit D 16 (11/2018).

## 2020-06-05 LAB
25(OH)D3 SERPL-MCNC: 36.8 NG/ML
ALBUMIN SERPL ELPH-MCNC: 4.5 G/DL
ALP BLD-CCNC: 52 U/L
ALT SERPL-CCNC: 14 U/L
ANION GAP SERPL CALC-SCNC: 11 MMOL/L
AST SERPL-CCNC: 14 U/L
BILIRUB SERPL-MCNC: 0.5 MG/DL
BUN SERPL-MCNC: 9 MG/DL
CALCIUM SERPL-MCNC: 9.3 MG/DL
CHLORIDE SERPL-SCNC: 104 MMOL/L
CO2 SERPL-SCNC: 23 MMOL/L
CREAT SERPL-MCNC: 0.64 MG/DL
ESTIMATED AVERAGE GLUCOSE: 114 MG/DL
GLUCOSE SERPL-MCNC: 94 MG/DL
HBA1C MFR BLD HPLC: 5.6 %
POTASSIUM SERPL-SCNC: 4.3 MMOL/L
PROT SERPL-MCNC: 7.2 G/DL
SODIUM SERPL-SCNC: 138 MMOL/L
T4 FREE SERPL-MCNC: 1.9 NG/DL
TSH SERPL-ACNC: 0.01 UIU/ML

## 2020-06-05 RX ORDER — METHIMAZOLE 10 MG/1
10 TABLET ORAL DAILY
Qty: 45 | Refills: 5 | Status: ACTIVE | COMMUNITY
Start: 2019-08-01 | End: 1900-01-01

## 2020-06-08 LAB — TSH RECEPTOR AB: <1.1 IU/L

## 2022-10-31 ENCOUNTER — APPOINTMENT (OUTPATIENT)
Dept: ENDOCRINOLOGY | Facility: CLINIC | Age: 57
End: 2022-10-31

## 2022-10-31 ENCOUNTER — NON-APPOINTMENT (OUTPATIENT)
Age: 57
End: 2022-10-31

## 2022-10-31 VITALS
RESPIRATION RATE: 18 BRPM | SYSTOLIC BLOOD PRESSURE: 100 MMHG | DIASTOLIC BLOOD PRESSURE: 68 MMHG | HEIGHT: 63 IN | BODY MASS INDEX: 21.62 KG/M2 | TEMPERATURE: 98 F | HEART RATE: 89 BPM | OXYGEN SATURATION: 97 % | WEIGHT: 122 LBS

## 2022-10-31 DIAGNOSIS — E05.90 THYROTOXICOSIS, UNSPECIFIED W/OUT THYROTOXIC CRISIS OR STORM: ICD-10-CM

## 2022-10-31 DIAGNOSIS — E55.9 VITAMIN D DEFICIENCY, UNSPECIFIED: ICD-10-CM

## 2022-10-31 DIAGNOSIS — R73.03 PREDIABETES.: ICD-10-CM

## 2022-10-31 LAB
BASOPHILS # BLD AUTO: 0.03 K/UL
BASOPHILS NFR BLD AUTO: 0.5 %
EOSINOPHIL # BLD AUTO: 0.25 K/UL
EOSINOPHIL NFR BLD AUTO: 4.5 %
ESTIMATED AVERAGE GLUCOSE: 126 MG/DL
HBA1C MFR BLD HPLC: 6 %
HCT VFR BLD CALC: 37.3 %
HGB BLD-MCNC: 12.7 G/DL
IMM GRANULOCYTES NFR BLD AUTO: 0.2 %
LYMPHOCYTES # BLD AUTO: 2.8 K/UL
LYMPHOCYTES NFR BLD AUTO: 49.9 %
MAN DIFF?: NORMAL
MCHC RBC-ENTMCNC: 32.2 PG
MCHC RBC-ENTMCNC: 34 GM/DL
MCV RBC AUTO: 94.7 FL
MONOCYTES # BLD AUTO: 0.44 K/UL
MONOCYTES NFR BLD AUTO: 7.8 %
NEUTROPHILS # BLD AUTO: 2.08 K/UL
NEUTROPHILS NFR BLD AUTO: 37.1 %
PLATELET # BLD AUTO: 175 K/UL
RBC # BLD: 3.94 M/UL
RBC # FLD: 12.8 %
WBC # FLD AUTO: 5.61 K/UL

## 2022-10-31 PROCEDURE — 99214 OFFICE O/P EST MOD 30 MIN: CPT

## 2022-10-31 NOTE — PROCEDURE
[[employer] e 2008 model, 10-12 MHz frequencies] : multiple real time longitudinal and transverse images were obtained using a high resolution ultrasound with a linear transducer, [employer] e 2008 model, 10-12 MHz frequencies. All measurements will be reported as longitudinal x jenelle-posterior x transverse. [None] : None [Goiter] : goiter [Hyperthyroidism] : hyperthyroidism [] : multiple ill defined areas of hypoechogenicity [There are no distinct nodules visualized.] : There are no distinct nodules visualized. [FreeTextEntry1] : 4.48 x 1.51 x 2.94 [FreeTextEntry5] : 4.81 x 1.22 x 2.72 [FreeTextEntry2] : 0.41 [FreeTextEntry6] : minimal bilateral vascularity

## 2022-10-31 NOTE — REVIEW OF SYSTEMS
[Fatigue] : fatigue [All other systems negative] : All other systems negative [Recent Weight Gain (___ Lbs)] : no recent weight gain [Recent Weight Loss (___ Lbs)] : no recent weight loss [Visual Field Defect] : no visual field defect [Dysphagia] : no dysphagia [Neck Pain] : no neck pain [Dysphonia] : no dysphonia [Chest Pain] : no chest pain [Shortness Of Breath] : no shortness of breath [Nausea] : no nausea [Vomiting] : no vomiting [Polyuria] : no polyuria [Joint Pain] : no joint pain [Headaches] : no headaches [Tremors] : no tremors [Polydipsia] : no polydipsia [Cold Intolerance] : no cold intolerance [Heat Intolerance] : no heat intolerance [FreeTextEntry2] : occasional fatigue

## 2022-10-31 NOTE — PHYSICAL EXAM
[Alert] : alert [Well Nourished] : well nourished [Healthy Appearance] : healthy appearance [Normal Voice/Communication] : normal voice communication [EOMI] : extra ocular movement intact [No Proptosis] : no proptosis [No Respiratory Distress] : no respiratory distress [No Accessory Muscle Use] : no accessory muscle use [Normal Rate and Effort] : normal respiratory rate and effort [No Stigmata of Cushings Syndrome] : no stigmata of Cushings Syndrome [Oriented x3] : oriented to person, place, and time [Normal Affect] : the affect was normal [Normal Hearing] : hearing was normal [Clear to Auscultation] : lungs were clear to auscultation bilaterally [Normal S1, S2] : normal S1 and S2 [Regular Rhythm] : with a regular rhythm [Normal Bowel Sounds] : normal bowel sounds [Not Tender] : non-tender [Not Distended] : not distended [Soft] : abdomen soft [de-identified] : visibly enlarged gland

## 2022-10-31 NOTE — ASSESSMENT
[Methimazole Therapy/PTU Therapy] : Risks and benefits of methimazole therapy/PTU therapy were discussed with the patient, including rash, liver dysfunction, and agranulocytosis. Patient was instructed to call the office for flu-like symptoms (e.g. fever and sore-throat) [FreeTextEntry1] : 56 y/o F w/ \par \par 1. Hyperthyroidism likely Graves' with heterogenous goiter on thyroid U/S 9/2015 (see results section for full description). Will repeat thyroid function tests now on methimazole. Discussed treatment with FARFAN vs. restarting methimazole vs. thyroidectomy. Discussed side effects such as cholestasis and agranulocytosis with methimazole. Prefer to avoid long term treatment with methimazole but patient defers FARFAN or thyroidectomy at this time.\par \par 2. Prediabetes- check A1c. Dietary and lifestyle modifications discussed.\par \par 3. Vit D Deficiency- current on vitamin D supplement with zinc and magnesium. Recommended additional Vit D supplement 2,000 IU daily. Repeat today.\par \par \par Pt. has not been seen in over 2 years. Prep time with review of labs and interval progress notes and consultations \par Discussion with patient regarding hyperthyroidism, prediabetes, and vitamin D deficiency with management plan, risks and benefits, treatment options and goals of care answering all patients questions and addressing all concerns \par Post-visit completion charting and review\par Total Time 30 min\par \par Specifically causes, evaluation, treatment options, risks, complications, and benefits of available therapies were discussed. Questions were answered.\par \par The submitted E/M billing level for this visit reflects the total time spent on the day of the visit including face-to-face time spent with the patient, non-face-to-face review of medical records and relevant information, documentation, and asynchronous communication with the patient after a visit via phone, email, or patient’s EHR portal after the visit. \par The medical records reviewed are either scanned into the chart or reviewed with the patient using a patient’s electronic medical records portal for patients with records not available to [facility] via electronic transmission platforms from other institutions and labs. \par Time spend counseling and performing coordination of care was also included in determining the appropriate EM billing level.\par \par I have reviewed and verified information regarding the chief complaint and history recorded by the ancillary staff and/or the patient. I have independently reviewed and interpreted tests performed by other physicians and facilities as necessary. \par \par I have discussed with the patient differential diagnosis, reason for auxiliary tests if ordered, risks, benefits, alternatives, and complications of each form of therapy were discussed. \par

## 2022-10-31 NOTE — DATA REVIEWED
[FreeTextEntry1] : Labs 6/2020:\par A1c 5.6%\par TSH 0.01\par Free T4 1.9\par TSH Receptor Ab neg.\par Vit D 36.8\par CMP normal\par \par \par Labs 9/2019:\par TSH 0.01\par Free T4 2.1\par TSH Receptor Ab neg.\par A1c 5.6%\par \par Labs 7/2019:\par TSH 0.01\par Free T4 3.1\par \par lABS 5/2019:\par TSH 0.11\par Free T4 1.7\par \par Labs 11/2018:\par A1c 5.7%\par TSH 0.51\par Free T4 1.5\par \par HDL 87\par Chol 234\par Trig 117\par CMP normal except glucose of 102\par Vit D 16\par \par Labs 5/2018:\par TSH 0.76\par Free T4 1.5\par \par Labs 2/2017:\par TSH 0.28\par Free T4 1.7\par CMP normal except glucose of 100\par \par Labs 11/2016:\par CMP normal\par Free T4 1.4\par TSH 1.56\par TSH Receptor Ab <1.00\par \par Labs 8/2016:\par TSH 1.32\par T4 7.9\par Free T4 1.38\par CMP normal\par \par Labs 3/2016:\par CMP normal except glucose of 101\par TSH 0.56\par Free T4 1.3\par \par Labs 12/2015:\par TSH 0.48\par Free T4 1.0\par \par Labs 9/2015:\par TSH 0.01\par Free T4 1.9\par TSH receptor Ab neg. \par \par Thyroid Ultrasound Report 9/1/15:\par \par Technique: multiple real time longitudinal and transverse images were obtained using a high resolution ultrasound with a linear transducer, [employer] e 2008 model, 10-12 MHz frequencies. All measurements will be reported as longitudinal x jenelle-posterior x transverse. \par Comparison: None. \par \par Indication: goiter and hyperthyroidism. \par \par Findings: \par The right thyroid lobe measures 4.48 x 1.51 x 2.94 cm. The left thyroid lobe measures 4.81 x 1.22 x 2.72 cm. The isthmus measures 0.41 cm. \par The right thyroid lobe has a heterogeneous parenchyma. It has multiple ill defined areas of hypoechogenicity. \par The left thyroid lobe has a heterogeneous parenchyma . It has multiple ill defined areas of hypoechogenicity. \par minimal bilateral vascularity. \par There are no distinct nodules visualized. \par \par Labs 8/2015:\par TSH 0.007\par CBC normal\par CMP normal except AlkP of 129\par \par Labs 4/2015:\par CBC normal except platelets of 147\par CMP normal except AlkP of 126\par TSH 0.013\par Free T4 2.9\par T3 203

## 2022-10-31 NOTE — HISTORY OF PRESENT ILLNESS
[FreeTextEntry1] : 58 y/o F diagnosed with hyperthyroidism 2/2015 with tachycardia, tremors, heat intolerance. Started initially on PTU, changed to methimazole 20 mg BID decreased to 10 mg daily. Seen initially by me 9/2015 with negative TSH receptor Ab TSH 0.01 and Free T4 1.9. Recommended increasing dose to 20 mg daily, but patient was still only taking 10 mg daily. Methimazole decreased to 7.5 mg 12/2015 based on TSH of 0.48 and Free T4 1.0. Dose lowered to 5 mg daily 8/2016 TSH 1.4 T4 7.9. Then 2.5 mg daily for TSH 0.28 Free T4 1.7 (2/2017). Seen 8/2017 with TSH of 0.99 Free T4 1.4 and negative TSH receptor Ab. Recommended discontinue methimazole and repeat TFTs. Presented 5/2018 for follow-up chemically and clinically euthyroid.  Returned 11/2018 and remained chemically euthyroid. Returned for follow-up 5/2019 with labs evidence of possible trending towards recurrence of hyperthyroidism with TSH of 0.11. Repeat labs 7/2019 confirmed overt hyperthyroidism with TSH of 0.01 and Free T4 of 3.1. Recommended restart methimazole at 10 mg daily which she has been adherent to. Repeat Labs 9/2029 with continued suppressed TSH but improved Free T4 to 2.1. Recommended uptake and scan and possible FARFAN tx, but patient defers FARFAN tx. Presented 6/2020 with repeat Free T4 still above goal at 1.9 with supressed TSH. Recommended increase to 15mg daily. Returns today for follow-up after over 2 years.+Fatigue occasionally. Now with improved palpitations, tremors. Now with some occasional heat and cold intolerance she attributes to menopause. +weight stable. Denies vomiting, abdominal pain, dizziness. +improved pruritus. No changes to vision, but claims she feels like "something is in her eyes". Denies lacrimation, or dryness. No family hx of thyroid conditions. No dysphagia, dysphonia. Never on lithium or amiodarone. No IV contrast. No recent upper respiratory infection. Was recommended for thyroid uptake and scan but did not go because she does not want to be treated with FARFAN. \par \par Hx of prediabetes with improvement on last A1c of 5.6%. Eats rice and noodles. Trying to monitor more closely. \par \par Hx of Vit D with last Vit D improved to 36 (6/2020)

## 2022-11-01 LAB
25(OH)D3 SERPL-MCNC: 28.2 NG/ML
ALBUMIN SERPL ELPH-MCNC: 4.5 G/DL
ALP BLD-CCNC: 128 U/L
ALT SERPL-CCNC: 17 U/L
ANION GAP SERPL CALC-SCNC: 12 MMOL/L
AST SERPL-CCNC: 17 U/L
BILIRUB SERPL-MCNC: 0.4 MG/DL
BUN SERPL-MCNC: 8 MG/DL
CALCIUM SERPL-MCNC: 9.4 MG/DL
CHLORIDE SERPL-SCNC: 104 MMOL/L
CHOLEST SERPL-MCNC: 287 MG/DL
CO2 SERPL-SCNC: 23 MMOL/L
CREAT SERPL-MCNC: 0.53 MG/DL
EGFR: 108 ML/MIN/1.73M2
GLUCOSE SERPL-MCNC: 125 MG/DL
HDLC SERPL-MCNC: 77 MG/DL
LDLC SERPL CALC-MCNC: 137 MG/DL
NONHDLC SERPL-MCNC: 211 MG/DL
POTASSIUM SERPL-SCNC: 4.2 MMOL/L
PROT SERPL-MCNC: 7.2 G/DL
SODIUM SERPL-SCNC: 140 MMOL/L
T4 FREE SERPL-MCNC: 1.6 NG/DL
TRIGL SERPL-MCNC: 370 MG/DL
TSH SERPL-ACNC: <0.01 UIU/ML

## 2022-11-02 LAB — TSH RECEPTOR AB: 1.34 IU/L
